# Patient Record
Sex: MALE | Race: ASIAN | NOT HISPANIC OR LATINO
[De-identification: names, ages, dates, MRNs, and addresses within clinical notes are randomized per-mention and may not be internally consistent; named-entity substitution may affect disease eponyms.]

---

## 2022-04-15 PROBLEM — Z00.00 ENCOUNTER FOR PREVENTIVE HEALTH EXAMINATION: Status: ACTIVE | Noted: 2022-04-15

## 2022-04-21 ENCOUNTER — NON-APPOINTMENT (OUTPATIENT)
Age: 72
End: 2022-04-21

## 2022-06-15 ENCOUNTER — OUTPATIENT (OUTPATIENT)
Dept: OUTPATIENT SERVICES | Facility: HOSPITAL | Age: 72
LOS: 1 days | Discharge: HOME | End: 2022-06-15
Payer: MEDICARE

## 2022-06-15 ENCOUNTER — APPOINTMENT (OUTPATIENT)
Dept: GASTROENTEROLOGY | Facility: HOSPITAL | Age: 72
End: 2022-06-15

## 2022-06-15 PROCEDURE — C1769: CPT

## 2022-06-15 PROCEDURE — 43262 ENDO CHOLANGIOPANCREATOGRAPH: CPT

## 2022-06-15 PROCEDURE — C1889: CPT

## 2022-06-15 PROCEDURE — 74328 X-RAY BILE DUCT ENDOSCOPY: CPT | Mod: 26

## 2022-06-15 PROCEDURE — 74328 X-RAY BILE DUCT ENDOSCOPY: CPT

## 2022-06-15 PROCEDURE — 43264 ERCP REMOVE DUCT CALCULI: CPT

## 2022-06-15 PROCEDURE — 43242 EGD US FINE NEEDLE BX/ASPIR: CPT | Mod: 59

## 2022-06-15 PROCEDURE — 43264 ERCP REMOVE DUCT CALCULI: CPT | Mod: 59

## 2022-06-15 DEVICE — GWIRE .25 X 450CM STRT: Type: IMPLANTABLE DEVICE | Status: FUNCTIONAL

## 2022-06-15 DEVICE — HYDRATOME 44: Type: IMPLANTABLE DEVICE | Status: FUNCTIONAL

## 2022-06-15 DEVICE — BLLN EXTRCTR PRO RX-S 9-12MM: Type: IMPLANTABLE DEVICE | Status: FUNCTIONAL

## 2022-07-07 ENCOUNTER — APPOINTMENT (OUTPATIENT)
Dept: GASTROENTEROLOGY | Facility: CLINIC | Age: 72
End: 2022-07-07

## 2022-07-07 VITALS
OXYGEN SATURATION: 97 % | WEIGHT: 115 LBS | RESPIRATION RATE: 15 BRPM | HEART RATE: 93 BPM | TEMPERATURE: 97.9 F | DIASTOLIC BLOOD PRESSURE: 60 MMHG | SYSTOLIC BLOOD PRESSURE: 95 MMHG

## 2022-07-07 DIAGNOSIS — Z78.9 OTHER SPECIFIED HEALTH STATUS: ICD-10-CM

## 2022-07-07 DIAGNOSIS — Z87.891 PERSONAL HISTORY OF NICOTINE DEPENDENCE: ICD-10-CM

## 2022-07-07 DIAGNOSIS — Z01.812 ENCOUNTER FOR PREPROCEDURAL LABORATORY EXAMINATION: ICD-10-CM

## 2022-07-07 PROCEDURE — 99203 OFFICE O/P NEW LOW 30 MIN: CPT

## 2022-07-07 NOTE — ASSESSMENT
[FreeTextEntry1] : Plan for enteroscopy and ERCP\par Currently no fever or chills\par No jaundice\par Discussed risk of enteroscopy and bowel perforation and that it may not be possible to perform stone extraction.

## 2022-07-07 NOTE — PHYSICAL EXAM
[General Appearance - Alert] : alert [General Appearance - In No Acute Distress] : in no acute distress [General Appearance - Well Nourished] : well nourished [General Appearance - Well Developed] : well developed [Sclera] : the sclera and conjunctiva were normal [Neck Appearance] : the appearance of the neck was normal [Neck Cervical Mass (___cm)] : no neck mass was observed [Jugular Venous Distention Increased] : there was no jugular-venous distention [Exaggerated Use Of Accessory Muscles For Inspiration] : no accessory muscle use [Heart Sounds] : normal S1 and S2 [Abdomen Soft] : soft [Abdomen Tenderness] : non-tender [Cervical Lymph Nodes Enlarged Posterior Bilaterally] : posterior cervical [Cervical Lymph Nodes Enlarged Anterior Bilaterally] : anterior cervical [No CVA Tenderness] : no ~M costovertebral angle tenderness [No Spinal Tenderness] : no spinal tenderness [Nail Clubbing] : no clubbing  or cyanosis of the fingernails [] : no rash [No Focal Deficits] : no focal deficits [Oriented To Time, Place, And Person] : oriented to person, place, and time [Impaired Insight] : insight and judgment were intact [Affect] : the affect was normal

## 2022-09-06 LAB — SARS-COV-2 N GENE NPH QL NAA+PROBE: DETECTED

## 2022-09-21 ENCOUNTER — OUTPATIENT (OUTPATIENT)
Dept: OUTPATIENT SERVICES | Facility: HOSPITAL | Age: 72
LOS: 1 days | Discharge: HOME | End: 2022-09-21
Payer: MEDICARE

## 2022-09-21 ENCOUNTER — APPOINTMENT (OUTPATIENT)
Dept: GASTROENTEROLOGY | Facility: HOSPITAL | Age: 72
End: 2022-09-21

## 2022-09-21 PROCEDURE — C1769: CPT

## 2022-09-21 PROCEDURE — 43260 ERCP W/SPECIMEN COLLECTION: CPT

## 2022-09-21 PROCEDURE — 44360 SMALL BOWEL ENDOSCOPY: CPT

## 2022-09-21 DEVICE — HYDRATOME 44: Type: IMPLANTABLE DEVICE | Status: FUNCTIONAL

## 2022-11-03 ENCOUNTER — APPOINTMENT (OUTPATIENT)
Dept: GASTROENTEROLOGY | Facility: CLINIC | Age: 72
End: 2022-11-03

## 2022-11-03 VITALS
DIASTOLIC BLOOD PRESSURE: 60 MMHG | BODY MASS INDEX: 22.49 KG/M2 | HEART RATE: 77 BPM | HEIGHT: 65 IN | WEIGHT: 135 LBS | TEMPERATURE: 97.5 F | SYSTOLIC BLOOD PRESSURE: 110 MMHG | OXYGEN SATURATION: 97 % | RESPIRATION RATE: 14 BRPM

## 2022-11-03 DIAGNOSIS — K80.50 CALCULUS OF BILE DUCT W/OUT CHOLANGITIS OR CHOLECYSTITIS W/OUT OBSTRUCTION: ICD-10-CM

## 2022-11-03 PROCEDURE — 99214 OFFICE O/P EST MOD 30 MIN: CPT

## 2022-11-09 ENCOUNTER — APPOINTMENT (OUTPATIENT)
Dept: INTERVENTIONAL RADIOLOGY/VASCULAR | Facility: HOSPITAL | Age: 72
End: 2022-11-09
Payer: MEDICARE

## 2022-11-09 PROCEDURE — XXXXX: CPT | Mod: 1L

## 2022-11-11 NOTE — HISTORY OF PRESENT ILLNESS
[FreeTextEntry1] : 73 yo M with pmh cholangitis s/p hepaticojejunostomy with persistent right hepatic duct and distal CBD stones s/p ERCP 6/15/22 with bilary randez vous for removal of distal stones and s/p single balloon enteroscopy 9/21/22 with inability to reach hepaticojejunostomy, now presenting for follow up to discuss alternative options for stone retrieval. \par \par Initially referred by Dr. Manohar Lagos for EUS/ERCP due to moderate intrahepatic biliary dilatation and hypointense stones in the R intrahepatic bile ducts seen on MRI from 04/08/2022. \par \par EUS/ ERCP 6/15/22:\par - dilated distal bile duct with a large impacted stones (~1-2cm)\par - Evidence of hepaticojejunostomy \par - Biliary rendez vous for removal of distal stone. Unable to access proximal stones\par \par ERCP/ single balloon enteroscopy 9/21/22\par Limited exam of esophagus, stomach, duodenum grossly unremarkable. J-J anastomosis noted, investigated all limbs thoroughly, with the use of contrast, a stiffener, and even exchanged PCF for a single balloon scope to help \par investigate all limbs as far distally as possible, however unable to reach hepaticojejunostomy. Procedure aborted.\par \par Today patient feels well with no complaints. Denies abdominal pain, yellowing of eyes, jaundice, n/v, wt loss, diarrhea/ constipation/alterations in bowel habits.\par \par Tob: former\par ETOH: denies

## 2022-11-11 NOTE — PHYSICAL EXAM
[Alert] : alert [Well Developed] : well developed [Sclera] : the sclera and conjunctiva were normal [Hearing Threshold Finger Rub Not [place]] : hearing was normal [Normal Appearance] : the appearance of the neck was normal [No Respiratory Distress] : no respiratory distress [Abdomen Tenderness] : non-tender [No Masses] : no abdominal mass palpated [Abnormal Walk] : normal gait [Normal Color / Pigmentation] : normal skin color and pigmentation [No Focal Deficits] : no focal deficits [Oriented To Time, Place, And Person] : oriented to person, place, and time [Rebound Tenderness] : no rebound tenderness

## 2022-11-11 NOTE — ASSESSMENT
[FreeTextEntry1] : 73 yo M with PMHx cholangitis s/p hepaticojejunostomy with persistent right hepatic duct and distal CBD stones s/p ERCP 6/15/22 with biliary rendez vous for removal of distal stones and s/p single balloon enteroscopy 9/21/22 with inability to reach hepaticojejunostomy, now presenting for follow up to discuss alternative options for stone retrieval. \par \par #Choledocholithiasis\par - Patient feeling well at present; however discussed with patient and son future risks of stone retention\par - Options include surgery. Alternatively can consider a 2 stage procedure with IR guided placement of drain into duct. After tract created, would remove drain and perform percutaneous endoscopy to remove stones. Explained this may require multiple sessions due to stone size and location\par - Obtain imaging to upload\par - Will present case at upcoming [facility]\par \par Cyndi Huddleston MD\par GI Fellow

## 2022-11-25 ENCOUNTER — APPOINTMENT (OUTPATIENT)
Dept: INTERVENTIONAL RADIOLOGY/VASCULAR | Facility: HOSPITAL | Age: 72
End: 2022-11-25

## 2022-11-25 ENCOUNTER — OUTPATIENT (OUTPATIENT)
Dept: OUTPATIENT SERVICES | Facility: HOSPITAL | Age: 72
LOS: 1 days | End: 2022-11-25
Payer: MEDICARE

## 2022-11-25 ENCOUNTER — RESULT REVIEW (OUTPATIENT)
Age: 72
End: 2022-11-25

## 2022-11-25 VITALS
WEIGHT: 115.96 LBS | OXYGEN SATURATION: 95 % | TEMPERATURE: 98 F | HEART RATE: 71 BPM | RESPIRATION RATE: 19 BRPM | DIASTOLIC BLOOD PRESSURE: 61 MMHG | SYSTOLIC BLOOD PRESSURE: 97 MMHG

## 2022-11-25 LAB
GRAM STN FLD: SIGNIFICANT CHANGE UP
SPECIMEN SOURCE: SIGNIFICANT CHANGE UP

## 2022-11-25 PROCEDURE — 47533 PLMT BILIARY DRAINAGE CATH: CPT

## 2022-11-25 PROCEDURE — C1894: CPT

## 2022-11-25 PROCEDURE — C1729: CPT

## 2022-11-25 PROCEDURE — 87075 CULTR BACTERIA EXCEPT BLOOD: CPT

## 2022-11-25 PROCEDURE — C1769: CPT

## 2022-11-25 PROCEDURE — 87181 SC STD AGAR DILUTION PER AGT: CPT

## 2022-11-25 PROCEDURE — 87205 SMEAR GRAM STAIN: CPT

## 2022-11-25 PROCEDURE — C1887: CPT

## 2022-11-25 PROCEDURE — 87186 SC STD MICRODIL/AGAR DIL: CPT

## 2022-11-25 PROCEDURE — 87070 CULTURE OTHR SPECIMN AEROBIC: CPT

## 2022-11-25 NOTE — PRE-ANESTHESIA EVALUATION ADULT - NSANTHOSAYNRD_GEN_A_CORE
No. YUSUF screening performed.  STOP BANG Legend: 0-2 = LOW Risk; 3-4 = INTERMEDIATE Risk; 5-8 = HIGH Risk

## 2022-11-27 LAB
-  AMPICILLIN/SULBACTAM: SIGNIFICANT CHANGE UP
-  AMPICILLIN/SULBACTAM: SIGNIFICANT CHANGE UP
-  AMPICILLIN: SIGNIFICANT CHANGE UP
-  CEFAZOLIN: SIGNIFICANT CHANGE UP
-  CEFAZOLIN: SIGNIFICANT CHANGE UP
-  CEFTRIAXONE: SIGNIFICANT CHANGE UP
-  CEFTRIAXONE: SIGNIFICANT CHANGE UP
-  CIPROFLOXACIN: SIGNIFICANT CHANGE UP
-  CIPROFLOXACIN: SIGNIFICANT CHANGE UP
-  ERTAPENEM: SIGNIFICANT CHANGE UP
-  ERTAPENEM: SIGNIFICANT CHANGE UP
-  GENTAMICIN: SIGNIFICANT CHANGE UP
-  GENTAMICIN: SIGNIFICANT CHANGE UP
-  PIPERACILLIN/TAZOBACTAM: SIGNIFICANT CHANGE UP
-  PIPERACILLIN/TAZOBACTAM: SIGNIFICANT CHANGE UP
-  TOBRAMYCIN: SIGNIFICANT CHANGE UP
-  TOBRAMYCIN: SIGNIFICANT CHANGE UP
-  TRIMETHOPRIM/SULFAMETHOXAZOLE: SIGNIFICANT CHANGE UP
-  TRIMETHOPRIM/SULFAMETHOXAZOLE: SIGNIFICANT CHANGE UP
-  VANCOMYCIN: SIGNIFICANT CHANGE UP
METHOD TYPE: SIGNIFICANT CHANGE UP

## 2022-11-28 LAB
-  AMPICILLIN/SULBACTAM: SIGNIFICANT CHANGE UP
-  AMPICILLIN: SIGNIFICANT CHANGE UP
-  CEFAZOLIN: SIGNIFICANT CHANGE UP
-  CEFTRIAXONE: SIGNIFICANT CHANGE UP
-  CIPROFLOXACIN: SIGNIFICANT CHANGE UP
-  CIPROFLOXACIN: SIGNIFICANT CHANGE UP
-  ERTAPENEM: SIGNIFICANT CHANGE UP
-  GENTAMICIN: SIGNIFICANT CHANGE UP
-  PIPERACILLIN/TAZOBACTAM: SIGNIFICANT CHANGE UP
-  TOBRAMYCIN: SIGNIFICANT CHANGE UP
-  TRIMETHOPRIM/SULFAMETHOXAZOLE: SIGNIFICANT CHANGE UP
CULTURE RESULTS: SIGNIFICANT CHANGE UP
METHOD TYPE: SIGNIFICANT CHANGE UP
METHOD TYPE: SIGNIFICANT CHANGE UP
ORGANISM # SPEC MICROSCOPIC CNT: SIGNIFICANT CHANGE UP
SPECIMEN SOURCE: SIGNIFICANT CHANGE UP

## 2022-12-02 ENCOUNTER — INPATIENT (INPATIENT)
Facility: HOSPITAL | Age: 72
LOS: 0 days | Discharge: HOME | DRG: 920 | End: 2022-12-02
Attending: STUDENT IN AN ORGANIZED HEALTH CARE EDUCATION/TRAINING PROGRAM | Admitting: STUDENT IN AN ORGANIZED HEALTH CARE EDUCATION/TRAINING PROGRAM
Payer: MEDICARE

## 2022-12-02 ENCOUNTER — APPOINTMENT (OUTPATIENT)
Dept: INTERVENTIONAL RADIOLOGY/VASCULAR | Facility: HOSPITAL | Age: 72
End: 2022-12-02

## 2022-12-02 VITALS
TEMPERATURE: 98 F | SYSTOLIC BLOOD PRESSURE: 103 MMHG | OXYGEN SATURATION: 98 % | RESPIRATION RATE: 18 BRPM | HEART RATE: 88 BPM | WEIGHT: 119.93 LBS | DIASTOLIC BLOOD PRESSURE: 70 MMHG

## 2022-12-02 VITALS
DIASTOLIC BLOOD PRESSURE: 81 MMHG | RESPIRATION RATE: 18 BRPM | HEART RATE: 80 BPM | SYSTOLIC BLOOD PRESSURE: 131 MMHG | TEMPERATURE: 98 F | OXYGEN SATURATION: 97 %

## 2022-12-02 LAB
ALBUMIN SERPL ELPH-MCNC: 3.8 G/DL — SIGNIFICANT CHANGE UP (ref 3.3–5)
ALP SERPL-CCNC: 208 U/L — HIGH (ref 40–120)
ALT FLD-CCNC: 36 U/L — SIGNIFICANT CHANGE UP (ref 10–45)
ANION GAP SERPL CALC-SCNC: 11 MMOL/L — SIGNIFICANT CHANGE UP (ref 5–17)
APTT BLD: 35.3 SEC — SIGNIFICANT CHANGE UP (ref 27.5–35.5)
AST SERPL-CCNC: 38 U/L — SIGNIFICANT CHANGE UP (ref 10–40)
BASOPHILS # BLD AUTO: 0.01 K/UL — SIGNIFICANT CHANGE UP (ref 0–0.2)
BASOPHILS NFR BLD AUTO: 0.2 % — SIGNIFICANT CHANGE UP (ref 0–2)
BILIRUB SERPL-MCNC: 0.8 MG/DL — SIGNIFICANT CHANGE UP (ref 0.2–1.2)
BUN SERPL-MCNC: 14 MG/DL — SIGNIFICANT CHANGE UP (ref 7–23)
CALCIUM SERPL-MCNC: 9.1 MG/DL — SIGNIFICANT CHANGE UP (ref 8.4–10.5)
CHLORIDE SERPL-SCNC: 100 MMOL/L — SIGNIFICANT CHANGE UP (ref 96–108)
CO2 SERPL-SCNC: 26 MMOL/L — SIGNIFICANT CHANGE UP (ref 22–31)
CREAT SERPL-MCNC: 0.69 MG/DL — SIGNIFICANT CHANGE UP (ref 0.5–1.3)
EGFR: 98 ML/MIN/1.73M2 — SIGNIFICANT CHANGE UP
EOSINOPHIL # BLD AUTO: 0.03 K/UL — SIGNIFICANT CHANGE UP (ref 0–0.5)
EOSINOPHIL NFR BLD AUTO: 0.6 % — SIGNIFICANT CHANGE UP (ref 0–6)
GLUCOSE SERPL-MCNC: 121 MG/DL — HIGH (ref 70–99)
HCT VFR BLD CALC: 38.4 % — LOW (ref 39–50)
HGB BLD-MCNC: 12.7 G/DL — LOW (ref 13–17)
IMM GRANULOCYTES NFR BLD AUTO: 0.2 % — SIGNIFICANT CHANGE UP (ref 0–0.9)
INR BLD: 1.1 — SIGNIFICANT CHANGE UP (ref 0.88–1.16)
LACTATE SERPL-SCNC: 1.3 MMOL/L — SIGNIFICANT CHANGE UP (ref 0.5–2)
LYMPHOCYTES # BLD AUTO: 0.58 K/UL — LOW (ref 1–3.3)
LYMPHOCYTES # BLD AUTO: 12 % — LOW (ref 13–44)
MCHC RBC-ENTMCNC: 32 PG — SIGNIFICANT CHANGE UP (ref 27–34)
MCHC RBC-ENTMCNC: 33.1 GM/DL — SIGNIFICANT CHANGE UP (ref 32–36)
MCV RBC AUTO: 96.7 FL — SIGNIFICANT CHANGE UP (ref 80–100)
MONOCYTES # BLD AUTO: 0.36 K/UL — SIGNIFICANT CHANGE UP (ref 0–0.9)
MONOCYTES NFR BLD AUTO: 7.5 % — SIGNIFICANT CHANGE UP (ref 2–14)
NEUTROPHILS # BLD AUTO: 3.83 K/UL — SIGNIFICANT CHANGE UP (ref 1.8–7.4)
NEUTROPHILS NFR BLD AUTO: 79.5 % — HIGH (ref 43–77)
NRBC # BLD: 0 /100 WBCS — SIGNIFICANT CHANGE UP (ref 0–0)
PLATELET # BLD AUTO: 169 K/UL — SIGNIFICANT CHANGE UP (ref 150–400)
POTASSIUM SERPL-MCNC: 4 MMOL/L — SIGNIFICANT CHANGE UP (ref 3.5–5.3)
POTASSIUM SERPL-SCNC: 4 MMOL/L — SIGNIFICANT CHANGE UP (ref 3.5–5.3)
PROT SERPL-MCNC: 7.7 G/DL — SIGNIFICANT CHANGE UP (ref 6–8.3)
PROTHROM AB SERPL-ACNC: 13.1 SEC — SIGNIFICANT CHANGE UP (ref 10.5–13.4)
RBC # BLD: 3.97 M/UL — LOW (ref 4.2–5.8)
RBC # FLD: 12.7 % — SIGNIFICANT CHANGE UP (ref 10.3–14.5)
SARS-COV-2 RNA SPEC QL NAA+PROBE: NEGATIVE — SIGNIFICANT CHANGE UP
SODIUM SERPL-SCNC: 137 MMOL/L — SIGNIFICANT CHANGE UP (ref 135–145)
WBC # BLD: 4.82 K/UL — SIGNIFICANT CHANGE UP (ref 3.8–10.5)
WBC # FLD AUTO: 4.82 K/UL — SIGNIFICANT CHANGE UP (ref 3.8–10.5)

## 2022-12-02 PROCEDURE — C1729: CPT

## 2022-12-02 PROCEDURE — 99222 1ST HOSP IP/OBS MODERATE 55: CPT | Mod: GC

## 2022-12-02 PROCEDURE — 83605 ASSAY OF LACTIC ACID: CPT

## 2022-12-02 PROCEDURE — 99284 EMERGENCY DEPT VISIT MOD MDM: CPT

## 2022-12-02 PROCEDURE — 99285 EMERGENCY DEPT VISIT HI MDM: CPT | Mod: 25

## 2022-12-02 PROCEDURE — 87635 SARS-COV-2 COVID-19 AMP PRB: CPT

## 2022-12-02 PROCEDURE — 85025 COMPLETE CBC W/AUTO DIFF WBC: CPT

## 2022-12-02 PROCEDURE — 36415 COLL VENOUS BLD VENIPUNCTURE: CPT

## 2022-12-02 PROCEDURE — 80053 COMPREHEN METABOLIC PANEL: CPT

## 2022-12-02 PROCEDURE — 47536 EXCHANGE BILIARY DRG CATH: CPT

## 2022-12-02 PROCEDURE — 85730 THROMBOPLASTIN TIME PARTIAL: CPT

## 2022-12-02 PROCEDURE — C1769: CPT

## 2022-12-02 PROCEDURE — 87040 BLOOD CULTURE FOR BACTERIA: CPT

## 2022-12-02 PROCEDURE — 85610 PROTHROMBIN TIME: CPT

## 2022-12-02 RX ORDER — CEPHALEXIN 500 MG
1 CAPSULE ORAL
Qty: 40 | Refills: 0
Start: 2022-12-02 | End: 2022-12-11

## 2022-12-02 NOTE — ED ADULT TRIAGE NOTE - CHIEF COMPLAINT QUOTE
Mandarin speaking,  ID #[id number] used. Pt presents to ED c/o post op complication. Per son "he has stones in his liver and they put this catheter in 2 weeks ago and now it has a lot of drainage and pus around the insertion site". +fever 1 week ago.

## 2022-12-02 NOTE — ED ADULT NURSE NOTE - CHIEF COMPLAINT QUOTE
Mandarin speaking,  ID #[id number] used. Pt presents to ED c/o post op complication. Per son "he has stones in his liver and they put this catheter in 2 weeks ago and now it has a lot of drainage and pus around the insertion site". +fever 1 week ago.

## 2022-12-02 NOTE — ED PROVIDER NOTE - OBJECTIVE STATEMENT
71 y/o M, mandarin speaking ( #[id number]), PMHx of cholangitis s/p hepaticojejunostomy with persistent right hepatic duct and distal CBD stones s/p ERCP 6/15/22 with bilary rendezvous for removal of distal stones and s/p single balloon enteroscopy 9/21/22 with inability to reach hepaticojejunostomy, now presenting to ED w/ 1 day of redness and increased drainage of biliary tube drain, noted by wife. Pt denies fever, abdominal pain, chest pain, and SOB. 71 y/o M, mandarin speaking ( #[id number]), PMHx of cholangitis s/p hepaticojejunostomy with persistent right hepatic duct and distal CBD stones s/p ERCP 6/15/22 with bilary rendezvous for removal of distal stones and s/p single balloon enteroscopy 9/21/22 with inability to reach hepaticojejunostomy, now presenting to ED w/ 1 day of redness and increased drainage at site of biliary tube drain, noted by wife. Pt denies fever, abdominal pain, chest pain, and SOB.

## 2022-12-02 NOTE — ED PROVIDER NOTE - CLINICAL SUMMARY MEDICAL DECISION MAKING FREE TEXT BOX
Pt referred here for IR tube draining. VS are stable. Pt is afebrile. Labs showing normal WBC count and lactate. Consult GI fellow and discussed case w/ GI fellow. Pt referred here for redness, secretions around biliary tube. VS are stable. Pt is afebrile. Labs showing normal WBC count and lactate. Case disc with Dr. Nicholson. GI fellow, pt referred to ED for IR evaluation.

## 2022-12-02 NOTE — ED PROVIDER NOTE - PROGRESS NOTE DETAILS
discussed w lizzy, who disc w IR and DR. Nicholson, attempted to reach Dr. Nicholson wo pickup- plan to dc w oral keflex, fu in one week for focal cellulitis, lactate/wbc wnl, afebrile, strict return prec initially admitted as went to IR but then discussed w lizzy, who discussed w IR and Dr. Nicholson, attempted to reach Dr. Nicholson wo pickup- plan to dc w oral keflex, fu in one week for focal cellulitis, lactate/wbc wnl, afebrile, strict return prec

## 2022-12-02 NOTE — ED ADULT NURSE NOTE - NS ED NURSE LEVEL OF CONSCIOUSNESS MENTAL STATUS
Reason for Disposition   Wheezing is present    Protocols used: COUGH-ADULT-OH    Pt calling for MMO benefit. States he has been ill since the beginning of December. States symptoms started with nasal congestion and ear ache. That has resolved, but now he has chest congestion and cough. No fever. Pt states he notices SOB with activity. Pt is coughing frequently during triage call. He states he can hear himself wheezing. Has not been around any ill contacts. Triage indicates for pt to be seen in the office today. Recommended that pt call PCP office for an appointment. If no appointments are available, he should seek treatment at walk in clinic or [facility]. He denied needed assistance finding one in his area. Pt instructed to call back for any new or worsening symptoms. Patient verbalized understanding. Patient denies any other questions or concerns.
Awake/Alert

## 2022-12-02 NOTE — ED PROVIDER NOTE - PATIENT PORTAL LINK FT
You can access the FollowMyHealth Patient Portal offered by [facility] by registering at the following website: [email or web address]. By joining [employer]’s FollowMyHealth portal, you will also be able to view your health information using other applications (apps) compatible with our system.

## 2022-12-02 NOTE — PROCEDURE NOTE - PROCEDURE FINDINGS AND DETAILS
catheter had retracted slightly.  new drain placed.  focal irritation possible early cellulitis around drain - gave two grams ancef and prescribed keflex 250 mg q 4 hours x ten days

## 2022-12-02 NOTE — ED PROVIDER NOTE - CARE PROVIDER_API CALL
José Ortiz (MD)  Diagnostic Radiology; [facility]  [address] [place] [address]  Phone: [phone]  Fax: [phone]  Follow Up Time:

## 2022-12-02 NOTE — ED PROVIDER NOTE - NSFOLLOWUPINSTRUCTIONS_ED_ALL_ED_FT
Please take course of antibiotics sent to your pharmacy, follow up with Dr. Ortiz in one week.    [facility]  Family Medicine  [address] [place]  [place] [address]  Phone: [phone]      ??????????????,???? Angel ???????    ?????,??    Cellulitis, Adult       ???????????????????????????????????????????????????????????????,????????????????????      ??????    ???????????????????????????????????????????      ????????    ????????????????:  •???????(????)???      •????????????????????????????????????????      •?? 60 ??      •??????      •????(??)????(??)???????      •???    •??????,??:  •?????(??)?      •?????????(??????)?      •?????(??)?        •??????      •?????????        ?????????    ????????:  •?????????????      •???????????      •??????????????????      •?????      •???      •???      •???        ????????    ????????????????????????,??:  •???      •??????        ????????    ???????????:  •??,???????????????(????)?      •?????,???????????(??)???????      •????????,??????      ?????????? 1-2 ?????????      ????????:     ??     •??????????????????????????      •????????????,???????????????????????????,???????????      ????     •????????,???????????      • ?? ????????????      •????????,??(??)????????????????      •?????????????,????????      •?????????????,???????????????????????????????????????????        ??????,??????????:    •????      •????????? 1-2 ?????????      •?????????,?????????????      •??????????????      •?????????      •????????      •??????????(???),????????        ?????????????:    •???????      •?????      •?????????      •??????????????      •??????????      ???????????????????????????????????????????????(???? 911)???????????       ??    •????????????????????????????      •???????????????,?????????????      •??????????????????????????????????????,?????????,??????????      •???????????? 1–2 ???????,???????,???????????      •?????????????,???????????????????????????????????????????      ??????????????????????????????????,?????????????????

## 2022-12-02 NOTE — ED PROVIDER NOTE - CARE PROVIDERS DIRECT ADDRESSES
[email or web address]

## 2022-12-02 NOTE — ED ADULT NURSE NOTE - NSIMPLEMENTINTERV_GEN_ALL_ED
Implemented All Universal Safety Interventions:  [place] to call system. Call bell, personal items and telephone within reach. Instruct patient to call for assistance. Room bathroom lighting operational. Non-slip footwear when patient is off stretcher. Physically safe environment: no spills, clutter or unnecessary equipment. Stretcher in lowest position, wheels locked, appropriate side rails in place.

## 2022-12-02 NOTE — ED PROVIDER NOTE - PHYSICAL EXAMINATION
CONSTITUTIONAL: Awake, alert and in no apparent distress.  HEENT: Head is atraumatic. Eyes clear bilaterally, normal EOMI. Airway patent.  CARDIAC: Normal rate, regular rhythm.  Heart sounds S1, S2.   RESPIRATORY: Breath sounds clear and equal bilaterally. no tachypnea, respiratory distress.   GASTROINTESTINAL: Abdomen soft, +biliary tube w/ surrounding erythema and purulence, no active drainage, non-tender, no guarding, distension.  MUSCULOSKELETAL: Spine appears normal, no midline spinal tenderness, range of motion is not limited, no muscle or joint tenderness. no bony tenderness.   NEUROLOGICAL: Alert, no focal deficits, no motor or sensory deficits.  SKIN: Skin normal color for race, warm, dry and intact. No evidence of rash.  PSYCHIATRIC: Normal mood and affect. no apparent risk to self or others. CONSTITUTIONAL: Awake, alert and in no apparent distress.  HEENT: Head is atraumatic. Eyes clear bilaterally, normal EOMI. Airway patent.  CARDIAC: Normal rate, regular rhythm.  Heart sounds S1, S2.   RESPIRATORY: Breath sounds clear and equal bilaterally. no tachypnea, respiratory distress.   GASTROINTESTINAL: Abdomen soft, +biliary tube w/ surrounding erythema and purulence, no active drainage, rest of abd non-tender, no guarding, distension.  MUSCULOSKELETAL: Spine appears normal, no midline spinal tenderness, range of motion is not limited, no muscle or joint tenderness. no bony tenderness.   NEUROLOGICAL: Alert, no focal deficits, no motor or sensory deficits.  SKIN: Skin normal color for race, warm, dry and intact. No evidence of rash.  PSYCHIATRIC: Normal mood and affect. no apparent risk to self or others.

## 2022-12-02 NOTE — H&P ADULT - ATTENDING COMMENTS
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 Pt underwent IR procedure (last Friday 11/25) by , the segment 6 bile duct with multiple stones was accessed with a 14 Swiss drainage catheter placed through this duct with its locking pigtail position in the efferent limb of the hepaticojejunostomy, and plan for gradual removal of ductal stones by advanced GI Dr. Aguila in 4 weeks. Pt reports having fever the next day of procedure 11/26, and noted pus drainage at insertion site, denies fever, chills, pain,etc.   At ED, the biliary drain site appeared cellulitic, IR Dr. Ortiz was informed and performed a tube check with a new biliary drain placed and given Ancef 2gm iv during procedure. Pt has normal WBC, and decision made for discharge home on oral abx i.e. Keflex 250mg po q4hr for 10 days.    PMHx/PSHx: as above   Allergy; NKDA  SHx: no toxic habits   FHx: non-contributary to present illness     VSS  General:  man NAD AAOx3, Heart: RRR, normal S1 and S2, Lungs clear, abd with biliary drain site mildly erythematous, warmth, and minimal pus noted, NT, no HSM appreciated, Ext: no C/C/E,Neuro: non focal     Labs reviewed     ASSESSMENT AND PLAN:   73 y/[facility] M [facility] Mandarin speaking PMHx recurrent pyogenic cholangitis (RPC) status post left hepatectomy and hepaticojejunostomy in [place], hx of recurrent intrahepatic biliary stones with multiple stones within the right biliary ductal system abutting the large stone in the right common duct, failed attempted by ERCP in 6/2022 and 9/2022. Pt underwent IR procedure (last Friday 11/25) by , the segment 6 bile duct with multiple stones was accessed with a 14 Swiss drainage catheter placed through this duct with its locking pigtail position in the efferent limb of the hepaticojejunostomy, and plan for gradual removal of ductal stones by advanced GI Dr. Aguila in 4 weeks. Pt reports having fever the next day of procedure 11/26, and noted pus drainage at insertion site this am, At ED, the biliary drain site appeared cellulitic, IR Dr. Ortiz was informed and performed a tube check with a new biliary drain placed and given Ancef 2gm iv during procedure. Pt has normal WBC, and decision made for discharge home on oral abx i.e. Keflex 250mg po q4hr for 10 days for cellulitis.    - IR consult appreciated d/w Dr. Ortiz   - d/c home on oral Kelfex  - f/u PMD Dr. Holly Delgado   - f/u IR Dr. Ortiz in 2 weeks   - f/u GI Dr. Jesse Aguila for biliary ductal stone treatment in 4 weeks   - POC as d/w son# [phone], and admitting resident Dr. Silver Nicholson M.D.

## 2022-12-07 DIAGNOSIS — T85.79XA INFECTION AND INFLAMMATORY REACTION DUE TO OTHER INTERNAL PROSTHETIC DEVICES, IMPLANTS AND GRAFTS, INITIAL ENCOUNTER: ICD-10-CM

## 2022-12-07 DIAGNOSIS — Z20.822 CONTACT WITH AND (SUSPECTED) EXPOSURE TO COVID-19: ICD-10-CM
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
## 2022-12-07 DIAGNOSIS — L03.311 CELLULITIS OF ABDOMINAL WALL: ICD-10-CM
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
## 2022-12-07 LAB
CULTURE RESULTS: SIGNIFICANT CHANGE UP
CULTURE RESULTS: SIGNIFICANT CHANGE UP
SPECIMEN SOURCE: SIGNIFICANT CHANGE UP
SPECIMEN SOURCE: SIGNIFICANT CHANGE UP

## 2022-12-08 PROBLEM — Z98.890 OTHER SPECIFIED POSTPROCEDURAL STATES: Chronic | Status: ACTIVE | Noted: 2022-12-06

## 2022-12-08 PROBLEM — K83.09 OTHER CHOLANGITIS: Chronic | Status: ACTIVE | Noted: 2022-12-06

## 2022-12-09 ENCOUNTER — APPOINTMENT (OUTPATIENT)
Dept: INTERVENTIONAL RADIOLOGY/VASCULAR | Facility: HOSPITAL | Age: 72
End: 2022-12-09
Payer: MEDICARE

## 2022-12-09 PROCEDURE — XXXXX: CPT | Mod: 1L

## 2022-12-09 RX ORDER — CEPHALEXIN 500 MG
1 CAPSULE ORAL
Qty: 40 | Refills: 0
Start: 2022-12-09 | End: 2022-12-18

## 2022-12-09 NOTE — ASSESSMENT
[FreeTextEntry1] : RUQ with moderately crusted bandage. Bandage removed, suture intact, skin slightly erythematous with scant purulence only around insertion site, mild tenderness to palpation. ~100cc of bilious output in drainage bag. Bandage changed, and drain capped. Instructed pt to attach new drainage bag of he develops pain, fevers, chills, or drainage on bandage (purulent or bilious). pt repeated directions and verbalized understanding. Pt to follow up in 1 week for skin check (12/16 12pm), and keflex 250mg QID refilled for another 10 days. Instructed pt that his surgery with Dr. Aguila would be 4-6 weeks after original placement.

## 2022-12-09 NOTE — HISTORY OF PRESENT ILLNESS
[FreeTextEntry1] : Mandarin  used during visit. Pt here with wife for 1 week follow up s/p biliary drain evaluation and exchange with Dr. Ortiz. At that time, insertion site was erythematous and with purulent drainage. Pt was prescribed keflex 250mg QIDx10 days. Today, pt reports mild tenderness at insertion site when its touched, denies pain, fevers, chills. Pt reports there is "a lot" of drainage in the collecting bag, and they empty it multiple times a day. Pt asks when his surgery will occur.

## 2022-12-16 ENCOUNTER — APPOINTMENT (OUTPATIENT)
Dept: INTERVENTIONAL RADIOLOGY/VASCULAR | Facility: HOSPITAL | Age: 72
End: 2022-12-16

## 2022-12-22 ENCOUNTER — APPOINTMENT (OUTPATIENT)
Dept: INTERVENTIONAL RADIOLOGY/VASCULAR | Facility: HOSPITAL | Age: 72
End: 2022-12-22
Payer: MEDICARE

## 2022-12-22 PROCEDURE — XXXXX: CPT | Mod: 1L

## 2023-01-04 ENCOUNTER — OUTPATIENT (OUTPATIENT)
Dept: OUTPATIENT SERVICES | Facility: HOSPITAL | Age: 73
LOS: 1 days | Discharge: HOME | End: 2023-01-04
Payer: MEDICARE

## 2023-01-04 ENCOUNTER — APPOINTMENT (OUTPATIENT)
Dept: GASTROENTEROLOGY | Facility: HOSPITAL | Age: 73
End: 2023-01-04

## 2023-01-04 VITALS
TEMPERATURE: 97 F | HEART RATE: 82 BPM | OXYGEN SATURATION: 97 % | WEIGHT: 115.96 LBS | RESPIRATION RATE: 18 BRPM | HEIGHT: 61 IN | DIASTOLIC BLOOD PRESSURE: 77 MMHG | SYSTOLIC BLOOD PRESSURE: 130 MMHG

## 2023-01-04 PROCEDURE — C1729: CPT

## 2023-01-04 PROCEDURE — C1769: CPT

## 2023-01-04 PROCEDURE — 43273 ENDOSCOPIC PANCREATOSCOPY: CPT

## 2023-01-04 PROCEDURE — 43265 ERCP LITHOTRIPSY CALCULI: CPT | Mod: 59

## 2023-01-04 PROCEDURE — 74328 X-RAY BILE DUCT ENDOSCOPY: CPT | Mod: 26

## 2023-01-04 PROCEDURE — C1889: CPT

## 2023-01-04 PROCEDURE — 47999 UNLISTED PX BILIARY TRACT: CPT

## 2023-01-04 PROCEDURE — 75984 XRAY CONTROL CATHETER CHANGE: CPT | Mod: 26,59

## 2023-01-04 PROCEDURE — 76000 FLUOROSCOPY <1 HR PHYS/QHP: CPT

## 2023-01-04 DEVICE — HYDRA WIRE: Type: IMPLANTABLE DEVICE | Status: FUNCTIONAL

## 2023-01-04 DEVICE — IMPLANTABLE DEVICE: Type: IMPLANTABLE DEVICE | Status: FUNCTIONAL

## 2023-01-04 DEVICE — HYDRATOME 44: Type: IMPLANTABLE DEVICE | Status: FUNCTIONAL

## 2023-01-04 DEVICE — PROBE EHL BILIARY 1.9FR 375CM: Type: IMPLANTABLE DEVICE | Status: FUNCTIONAL

## 2023-01-04 RX ORDER — LEVOFLOXACIN 500 MG/1
500 TABLET, FILM COATED ORAL DAILY
Qty: 5 | Refills: 0 | Status: ACTIVE | COMMUNITY
Start: 2023-01-04 | End: 1900-01-01

## 2023-02-01 ENCOUNTER — OUTPATIENT (OUTPATIENT)
Dept: OUTPATIENT SERVICES | Facility: HOSPITAL | Age: 73
LOS: 1 days | Discharge: HOME | End: 2023-02-01
Payer: MEDICARE

## 2023-02-01 ENCOUNTER — APPOINTMENT (OUTPATIENT)
Dept: GASTROENTEROLOGY | Facility: HOSPITAL | Age: 73
End: 2023-02-01

## 2023-02-01 PROCEDURE — 74328 X-RAY BILE DUCT ENDOSCOPY: CPT | Mod: 26

## 2023-02-01 PROCEDURE — 43264 ERCP REMOVE DUCT CALCULI: CPT

## 2023-02-01 PROCEDURE — 43265 ERCP LITHOTRIPSY CALCULI: CPT

## 2023-02-01 PROCEDURE — 47531 INJECTION FOR CHOLANGIOGRAM: CPT

## 2023-02-01 PROCEDURE — 74330 X-RAY BILE/PANC ENDOSCOPY: CPT

## 2023-02-01 PROCEDURE — C1726: CPT

## 2023-02-01 PROCEDURE — C1769: CPT

## 2023-02-01 PROCEDURE — C1889: CPT

## 2023-02-01 PROCEDURE — 43277 ERCP EA DUCT/AMPULLA DILATE: CPT | Mod: XU

## 2023-02-01 DEVICE — PROBE EHL BILIARY 1.9FR 375CM: Type: IMPLANTABLE DEVICE | Status: FUNCTIONAL

## 2023-02-01 DEVICE — STONE BASKET TRAPEZOID WIREGUIDED 3CM .035" X 3.2MM: Type: IMPLANTABLE DEVICE | Status: FUNCTIONAL

## 2023-02-01 DEVICE — STONE BASKET TETRACATCH V ROTATABLE 4-WIRE 2.8MM: Type: IMPLANTABLE DEVICE | Status: FUNCTIONAL

## 2023-02-01 DEVICE — CATH BLN CRE 7.5FR 6-8MM: Type: IMPLANTABLE DEVICE | Status: FUNCTIONAL

## 2023-02-01 DEVICE — GUIDEWIRE FUSION OASIS 10FR: Type: IMPLANTABLE DEVICE | Status: FUNCTIONAL

## 2023-02-01 DEVICE — GWIRE .25 X 450CM STRT: Type: IMPLANTABLE DEVICE | Status: FUNCTIONAL

## 2023-03-01 ENCOUNTER — APPOINTMENT (OUTPATIENT)
Dept: GASTROENTEROLOGY | Facility: HOSPITAL | Age: 73
End: 2023-03-01

## (undated) DEVICE — NDL SLIMLINE FLEX 19G

## (undated) DEVICE — CANNULA PROFORMA STRT HF 4.5

## (undated) DEVICE — FORCEP PED RADIAL JAW 4 2.0MM DISP

## (undated) DEVICE — TUBE SPLINTING DISP

## (undated) DEVICE — ATTACH DISTAL

## (undated) DEVICE — SPYSCOPE DS 2